# Patient Record
Sex: FEMALE | Race: WHITE | NOT HISPANIC OR LATINO | Employment: PART TIME | URBAN - METROPOLITAN AREA
[De-identification: names, ages, dates, MRNs, and addresses within clinical notes are randomized per-mention and may not be internally consistent; named-entity substitution may affect disease eponyms.]

---
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
## 2018-01-11 NOTE — PROGRESS NOTES
Assessment   1  Encounter for preventive health examination (V70 0) (Z00 00)  2  Need for meningococcal vaccination (V03 89) (Z23)  3  Never smoked cigarettes (V49 89) (Z78 9)  4  Screening for depression (V79 0) (Z13 89)    Plan  Depression with anxiety    · PHQ-9 Adult Depression Screening ; every 4 months; Last 08JEN8547; Next 07REW8054;  Status:Active  Depression with anxiety, Screening for depression    · *VB-Depression Screening; Status:Complete;   Done: 62ILU8979 06:45PM   · *VB-Depression Screening ; every 4 months; Last 83QWO0450; Next 16KIF2111;  [address] Maintenance    · Brush your teeth 3 times a day and floss at least once a day ; Status:Complete;   Done:  36IPK9706   · Drink plenty of fluids ; Status:Complete;   Done: 44JFS6388   · Eat foods that are high in calcium ; Status:Complete;   Done: 22SQO2648   · We encourage all of our patients to exercise regularly  30 minutes of exercise or physical  activity five or more days a week is recommended for children and adults ;  Status:Complete;   Done: 58FWX4245   · We recommend routine visits to a dentist ; Status:Complete;   Done: 94AOB9436   · We recommend that you follow the "Mediterranean diet "; Status:Complete;   Done:  07KVE4090   · Call [phone] if: You find a new or different kind of lump in your breast ;  Status:Complete;   Done: 45RUQ7181   · Call [phone] if: You have any warning signs of skin cancer ; Status:Complete;    Done: 72LOY7494  Need for meningococcal vaccination    · Administered: Trumenba Intramuscular Suspension Prefilled Syringe  Screening for depression    · Call [phone] if: You have feelings of extreme sadness and feelings of  hopelessness ; Status:Complete;   Done: 32MPE9474   · Begin or continue regular aerobic exercise   Gradually work up to at least {count1}  sessions of {dur1} of exercise a week ; Status:Complete;   Done: 18BSO3685    Discussion/Summary  health maintenance visit cervical cancer screening is not indicated Breast cancer screening: breast cancer screening is not indicated  Colorectal cancer screening: colorectal cancer screening is not indicated  The immunizations are needed and immunizations will be given as outlined in the orders  Patient discussion: discussed with the patient  1) CONTINUE CURRENT MEDICATION  2) had disease: varicella 5/98  3) trumemba given today, return in 6 months 1  for trumemba #2   Possible side effects of new medications were reviewed with the patient/guardian today  The treatment plan was reviewed with the patient/guardian  The patient/guardian understands and agrees with the treatment plan       1 Amended By: Bhavna Miguel; Jun 28 2017 3:06 PM EST    Chief Complaint  PT HERE FOR A COLLEGE PE  PT WILL GET THE [facility]  History of Present Illness  HM, Adult Female: The patient is being seen for a health maintenance evaluation  The last health maintenance visit was 1 year(s) ago  General Health: The patient's health since the last visit is described as good  She has regular dental visits  She denies vision problems  She denies hearing loss  Immunizations status: not up to date  Lifestyle:  She consumes a diverse and healthy diet  She does not have any weight concerns  She does not exercise regularly  She does not use tobacco  She denies alcohol use  She denies drug use  Reproductive health: the patient is premenopausal    Screening: cancer screening reviewed and updated  metabolic screening reviewed and updated  risk screening reviewed and updated  HPI: pt here for well exam  denies complaints today  denies recent illnesses  menses regular on nuvaring  going to college in [employer]  states has not had a healthy diet  would like to lose weight  not much regular activity other than standing at [employer] for work all day and tired when she gets home     mom states she had chickenpox and states she had one vaccination but do not see documentation  some intermittent abdominal cramping with certain foods  Review of Systems    Constitutional: No fever, no chills, feels well, no tiredness, no recent weight gain or weight loss  Eyes: No complaints of eye pain, no red eyes, no eyesight problems, no discharge, no dry eyes, no itching of eyes  ENT: no complaints of earache, no loss of hearing, no nose bleeds, no nasal discharge, no sore throat, no hoarseness  Cardiovascular: No complaints of slow heart rate, no fast heart rate, no chest pain, no palpitations, no leg claudication, no lower extremity edema  Respiratory: No complaints of shortness of breath, no wheezing, no cough, no SOB on exertion, no orthopnea, no PND  Gastrointestinal: No complaints of abdominal pain, no constipation, no nausea or vomiting, no diarrhea, no bloody stools  Genitourinary: No complaints of dysuria, no incontinence, no pelvic pain, no dysmenorrhea, no vaginal discharge or bleeding  Musculoskeletal: No complaints of arthralgias, no myalgias, no joint swelling or stiffness, no limb pain or swelling  Integumentary: No complaints of skin rash or lesions, no itching, no skin wounds, no breast pain or lump  Neurological: No complaints of headache, no confusion, no convulsions, no numbness, no dizziness or fainting, no tingling, no limb weakness, no difficulty walking  Psychiatric: Not suicidal, no sleep disturbance, no anxiety or depression, no change in personality, no emotional problems  Endocrine: No complaints of proptosis, no hot flashes, no muscle weakness, no deepening of the voice, no feelings of weakness  Hematologic/Lymphatic: No complaints of swollen glands, no swollen glands in the neck, does not bleed easily, does not bruise easily  Active Problems   1  Allergic rhinitis (477 9) (J30 9)  2  Contraceptive use (V25 40) (Z30 40)  3  Depression with anxiety (300 4) (F41 8)  4   Need for influenza vaccination (V04 81) (Z23)    Past Medical History    · History of acute pharyngitis (V12 69) (Z87 09)   · History of low back pain (V13 59) (Z87 39)   · History of varicella (V12 09) (Z86 19)    Surgical History    · Denied: History Of Prior Surgery    Family History  Mother    · Family history of depression (V17 0) (Z81 8)   · Family history of hyperlipidemia (V18 19) (Z83 49)   · Family history of hypertension (V17 49) (Z82 49)  Father    · Family history of depression (V17 0) (Z81 8)   · Family history of diabetes mellitus (V18 0) (Z83 3)   · Family history of hypertension (V17 49) (Z82 49)  Family History    · Family history of Reported Family History Of Mental Illness (Not Retardation)    Social History    · Currently In School Public   · Living With Parents   · Never Drank Alcohol   · Never smoked cigarettes (V49 89) (Z78 9)    Current Meds  1  LaMICtal 100 MG Oral Tablet; take 3 tablet every day; Therapy: (Recorded:17Nov2015) to Recorded  2  Levsin/SL 0 125 MG Sublingual Tablet Sublingual; DISSOLVE 1 TABLET UNDER   TONGUE 4 TIMES A DAY AS NEEDED FOR ABDOMINAL PAIN;   Therapy: 56CJA9410 to (Last Rx:10Mar2016)  Requested for: 21XDW6660 Ordered  3  NuvaRing 0 12-0 015 MG/24HR Vaginal Ring; INSERT 1 RING VAGINALLY FOR 3   WEEKS THEN 1 WEEK OFF; Therapy: 62LKC9279 to (Last Rx:91Rhs3370)  Requested for: 94Ezo0764 Ordered    Allergies   1  No Known Drug Allergies    Vitals   Recorded: 12Jun2017 05:56PM   Temperature 97 2 F   Heart Rate 78   Systolic 985   Diastolic 66   Height 5 ft 9 in   Weight 161 lb    BMI Calculated 23 78   BSA Calculated 1 88   BMI Percentile 71 %   2-20 Stature Percentile 97 %   2-20 Weight Percentile 88 %   O2 Saturation 99     Physical Exam    Constitutional   General appearance: No acute distress, well appearing and well nourished  Head and Face   Head and face: Normal     Palpation of the face and sinuses: No sinus tenderness  Eyes   Conjunctiva and lids: No swelling, erythema or discharge      Pupils and irises: Equal, round, reactive to light  Ears, Nose, Mouth, and Throat   External inspection of ears and nose: Normal     Otoscopic examination: Tympanic membranes translucent with normal light reflex  Canals patent without erythema  Hearing: Normal     Nasal mucosa, septum, and turbinates: Normal without edema or erythema  Lips, teeth, and gums: Normal, good dentition  Oropharynx: Normal with no erythema, edema, exudate or lesions  Neck   Neck: Supple, symmetric, trachea midline, no masses  Thyroid: Normal, no thyromegaly  Pulmonary   Respiratory effort: No increased work of breathing or signs of respiratory distress  Auscultation of lungs: Clear to auscultation  Cardiovascular   Auscultation of heart: Normal rate and rhythm, normal S1 and S2, no murmurs  Examination of extremities for edema and/or varicosities: Normal     Abdomen   Abdomen: Non-tender, no masses  Liver and spleen: No hepatomegaly or splenomegaly  Lymphatic   Palpation of lymph nodes in neck: No lymphadenopathy  Musculoskeletal   Gait and station: Normal     Digits and nails: Normal without clubbing or cyanosis  Joints, bones, and muscles: Normal     Range of motion: Normal     Stability: Normal     Muscle strength/tone: Normal     Skin   Skin and subcutaneous tissue: Normal without rashes or lesions  Neurologic   Cortical function: Normal mental status  Coordination: Normal finger to nose and heel to shin  Psychiatric   Judgment and insight: Normal     Orientation to person, place, and time: Normal     Recent and remote memory: Intact      Mood and affect: Normal        Results/Data  *VB-Depression Screening 12Jun2017 06:45PM Juvenal Taylor     Test Name Result Flag Reference   Depression Scale Result      Depression Screen - Positive Findings     PHQ-9 Adult Depression Screening 12Jun2017 06:44PM Devin Benjamin     Test Name Result Flag Reference   PHQ-9 Adult Depression Score 11     Over the last two weeks, how often have you been bothered by any of the following problems? Little interest or pleasure in doing things: Several days - 1  Feeling down, depressed, or hopeless: Several days - 1  Trouble falling or staying asleep, or sleeping too much: More than half the days - 2  Feeling tired or having little energy: Nearly every day - 3  Poor appetite or over eating: More than half the days - 2  Feeling bad about yourself - or that you are a failure or have let yourself or your family down: Several days - 1  Trouble concentrating on things, such as reading the newspaper or watching television: Several days - 1  Moving or speaking so slowly that other people could have noticed  Or the opposite -  being so fidgety or restless that you have been moving around a lot more than usual: Not at all - 0  Thoughts that you would be better off dead, or of hurting yourself in some way: Not at all - 0   PHQ-9 Adult Depression Screening Positive     PHQ-9 Difficulty Level Somewhat difficult     PHQ-9 Severity Moderate Depression         Health Management  Depression with anxiety   PHevery-9 Adult Depression Screening; every 4 months; Last 77TPA0681; Next Due:  30MUY6019;  Active    Signatures   Electronically signed by : Dominick Cheema DO; Jun 28 2017  3:06PM EST                       (Author)

## 2018-01-11 NOTE — PROGRESS NOTES
Assessment    1  Encounter for preventive health examination (V70 0) (Z00 00)   2  Lower back pain (724 2) (M54 5)   3  Need for hepatitis A vaccination (V05 3) (Z23)    Plan  Need for hepatitis A vaccination    · Hepatitis A    Discussion/Summary    Impression:   No growth, development, elimination, feeding, skin and sleep concerns  No medication changes  Information discussed with patient  Recommend immz  update, patient agrees to get hepatitis A  RTO as needed  Form filled out for school  Chief Complaint  PT IS HERE FOR YEARLY WELLNESS / SCHOOL PHYSICAL  VISION CHECKED AND DOCUMENTED IN CHART  PT GIVEN INFORMATION ON HEP A AND TRUMENBA  MOM IN WAITING ROOM IS GOOD WITH ANY IMMUNIZATIONS DUE  PT DOES PLAN TO ATTEND COLLEGE  History of Present Illness  HM, 12-18 years Female (Brief): Sky Cordon presents today for routine health maintenance with her mother and MOTHER IN WAITING ROOM  General Health: The child's health since the last visit is described as good  Dental hygiene: Good  Caregiver concerns:   Caregivers deny concerns regarding nutrition, sleep, school and elimination  Nutrition/Elimination:   Diet:  her current diet is diverse and healthy  Sleep:  No sleep issues are reported  Behavior: The child's temperament is described as calm, happy and energetic  Health Risks:   Weekly activity: 2 hour(s) of exercise per day and she gets exercise 4 times per week  Childcare/School: She is in grade 12 in [employer] high school  School performance has been good  Sports Participation Questions:   HPI: 23year old white female presents for PE  Had one episode of abdominal cramps since 3/8/16- ER visit for abd  cramps  LNMP- 3/27/16, regular, on now  Denies having vaginal discharge  Patient on crew team/rowing  Sees psychiatrist for depression, every 3 months, currently on Lamictal  Plans to go to [facility]        Review of Systems    Constitutional: no chills, no fever, not feeling poorly and not feeling tired  Eyes: no eyesight problems and no purulent discharge from the eyes  ENT: no nasal discharge, no earache, no hearing loss, no nosebleeds and no sore throat  Cardiovascular: no chest pain and no palpitations  Respiratory: no cough and no shortness of breath  Gastrointestinal: no abdominal pain, no nausea, no vomiting, no constipation and no diarrhea  Neurological: no headache, no numbness, no tingling, no dizziness and no fainting  Psychiatric: not suicidal, no emotional problems, no sleep disturbances, no depression and no anxiety  Over the past 2 weeks, how often have you been bothered by the following problems? 1 ) Little interest or pleasure in doing things? Not at all    2 ) Feeling down, depressed or hopeless? Not at all    3 ) Trouble falling asleep or sleeping too much? Several days  4 ) Feeling tired or having little energy? Several days  5 ) Poor appetite or overeating? Not at all    6 ) Feeling bad about yourself, or that you are a failure, or have let yourself or your family down? Not at all    7 ) Trouble concentrating on things, such as reading a newspaper or watching television? Several days  8 ) Moving or speaking so slowly that other people could have noticed, or the opposite, moving or speaking faster than usual? Not at all    9 ) Thoughts that you would be better off dead or of hurting yourself in some way? Not at all  Score 3      Active Problems    1  Allergic rhinitis (477 9) (J30 9)   2  Contraceptive use (V25 40) (Z30 40)   3  Depression with anxiety (300 4) (F41 8)   4  Folliculitis (938 2) (X90 4)   5  Gastroenteritis (558 9) (K52 9)   6  Lower back pain (724 2) (M54 5)   7  Need for HPV vaccination (V04 89) (Z23)   8  Need for influenza vaccination (V04 81) (Z23)   9  Screening for deficiency anemia (V78 1) (Z13 0)   10   Screening for endocrine, nutritional, metabolic and immunity disorder (V77 99) (Z13 29,Z13 0,Z13 21,Z13 228)    Past Medical History    · History of acute pharyngitis (V12 69) (Z87 09)   · History of varicella (V12 09) (Z86 19)    Surgical History    · Denied: History Of Prior Surgery    Family History    · Family history of depression (V17 0) (Z81 8)   · Family history of hyperlipidemia (V18 19) (Z83 49)   · Family history of hypertension (V17 49) (Z82 49)    · Family history of depression (V17 0) (Z81 8)   · Family history of diabetes mellitus (V18 0) (Z83 3)   · Family history of hypertension (V17 49) (Z82 49)    · Family history of Reported Family History Of Mental Illness (Not Retardation)    Social History    · Currently In School Public   · Living With Parents   · Never A Smoker   · Never Drank Alcohol    Current Meds   1  Donnatal 16 2 MG/5ML Oral Elixir; 1 tsp tid prn; Therapy: 92NUY6592 to (Last Rx:10Mar2016)  Requested for: 95AWR7404 Ordered   2  LaMICtal 100 MG Oral Tablet; take 3 tablet every day; Therapy: (Recorded:17Nov2015) to Recorded   3  Levsin/SL 0 125 MG Sublingual Tablet Sublingual; DISSOLVE 1 TABLET UNDER   TONGUE 4 TIMES A DAY AS NEEDED FOR ABDOMINAL PAIN;   Therapy: 93HQP5673 to (Last Rx:10Mar2016)  Requested for: 08UVW1037 Ordered   4  NuvaRing 0 12-0 015 MG/24HR Vaginal Ring; Therapy: 67MPD6176 to Recorded    Allergies    1  No Known Drug Allergies    Vitals   Recorded: 40RHB6214 02:54PM   Temperature 97 4 F   Heart Rate 68   Systolic 981   Diastolic 70   Height 5 ft 9 in   Weight 160 lb 8 0 oz   BMI Calculated 23 7   BSA Calculated 1 88   O2 Saturation 98     Physical Exam    Constitutional - General appearance: No acute distress, well appearing and well nourished  Head and Face - Head and face: Normocephalic, atraumatic  Eyes - Conjunctiva and lids: No injection, edema or discharge  Pupils and irises: Equal, round, reactive to light bilaterally     Ears, Nose, Mouth, and Throat - External inspection of ears and nose: Normal without deformities or discharge  Otoscopic examination: Tympanic membranes gray, translucent with good bony landmarks and light reflex  Canals patent without erythema  Hearing: Normal  Nasal mucosa, septum, and turbinates: Normal, no edema or discharge  Lips, teeth, and gums: Normal, good dentition  Oropharynx: Moist mucosa, normal tongue and tonsils without lesions  Neck - Neck: Supple, symmetric, no masses  Thyroid: No thyromegaly  Pulmonary - Respiratory effort: Normal respiratory rate and rhythm, no increased work of breathing  Auscultation of lungs: Clear bilaterally  Cardiovascular - Auscultation of heart: Regular rate and rhythm, normal S1 and S2, no murmur  Examination of extremities for edema and/or varicosities: Normal    Chest - Breasts: Normal    Abdomen - Abdomen: Normal bowel sounds, soft, non-tender, no masses  Liver and spleen: No hepatomegaly or splenomegaly  Lymphatic - Palpation of lymph nodes in neck: No anterior or posterior cervical lymphadenopathy  Musculoskeletal - Gait and station: Normal gait  Digits and nails: Normal without clubbing or cyanosis  Inspection/palpation of joints, bones, and muscles: Normal  Evaluation for scoliosis: No scoliosis on exam  Range of motion: Normal  Stability: No joint instability  Muscle strength/tone: Normal    Skin - Skin and subcutaneous tissue: Normal    Neurologic - Sensation: Normal    Psychiatric - judgment and insight: Normal  Orientation to person, place, and time: Normal  Recent and remote memory: Normal  Mood and affect: Normal       Procedure    Procedure: Visual Acuity Test    Indication: routine screening  Inforrmation supplied by a Snellen chart  Results: 20/20 in both eyes without corrective device, 20/25 in the right eye without corrective device, 20/25 in the left eye without corrective device normal in both eyes     Color vision was and the results were normal       Signatures   Electronically signed by : JUDE Laura; Mar 29 2016  3:39PM EST (Author)    Electronically signed by : Faisal Vigil DO; Mar 29 2016  6:54PM EST                       (Author)

## 2018-01-12 VITALS
HEART RATE: 78 BPM | BODY MASS INDEX: 23.85 KG/M2 | SYSTOLIC BLOOD PRESSURE: 108 MMHG | WEIGHT: 161 LBS | DIASTOLIC BLOOD PRESSURE: 66 MMHG | HEIGHT: 69 IN | OXYGEN SATURATION: 99 % | TEMPERATURE: 97.2 F

## 2018-01-14 NOTE — RESULT NOTES
Verified Results  [facility]) UA/M w/rflx Culture, Comp [id number] 12:00AM [place] [facility]     Test Name Result Flag Reference   Specific Gravity 1 020  1 005-1 030   pH 6 5  5 0-7 5   Urine-Color Yellow  Yellow   Appearance Cloudy A Clear   WBC Esterase 3+ A Negative   Protein 2+ A Negative/Trace   Glucose Negative  Negative   Ketones Negative  Negative   Occult Blood 2+ A Negative   Bilirubin Negative  Negative   Urobilinogen,Semi-Qn 0 2 EU/dL  0 2-1 0   Nitrite, Urine Positive A Negative   Microscopic Examination See below:     Urinalysis Reflex Comment     This specimen has reflexed to a Urine Culture  WBC >30 /hpf A 0 -  5   RBC 11-30 /hpf A 0 -  2   Epithelial Cells (non renal) 0-10 /hpf  0 - 10   Mucus Threads Present  Not Estab     Bacteria Few  None seen/Few   Result 1 Escherichia coli A    50,000-100,000 colony forming units per mL   Urine Culture,Comprehensive Final report A    Antimicrobial Susceptibility Comment     ** S = Susceptible; I = Intermediate; R = Resistant **                     P = Positive; N = Negative              MICS are expressed in micrograms per mL     Antibiotic                 RSLT#1    RSLT#2    RSLT#3    RSLT#4  Amoxicillin/Clavulanic Acid    S  Ampicillin                     R  Cefazolin                      R  Cefepime                       S  Ceftriaxone                    R  Cefuroxime                     R  Cephalothin                    R  Ciprofloxacin                  S  Ertapenem                      S  Gentamicin                     S  Imipenem                       S  Levofloxacin                   S  Nitrofurantoin                 S  Piperacillin                   R  Tetracycline                   R  Tobramycin                     S  Trimethoprim/Sulfa             R

## 2018-03-07 NOTE — PROGRESS NOTES
History of Present Illness    Revaccination   Vaccine Information: Vaccine(s) Given (names): hep a #3  Spoke with patient regarding vaccine out of temperature range  Action(s): Pt will be revaccinated  Pt called (attempt 1): [id number] LDS  Other Information: 03/17/2017 - SPOKE WITH PT, WILL BE REVACCINATED FOR HEP A ON 03/23  LDS    03/23/2017 revac Hep B #3  Revaccination Completed: 03/23/2017  Active Problems    1  Acute urinary tract infection (599 0) (N39 0)   2  Allergic rhinitis (477 9) (J30 9)   3  Contraceptive use (V25 40) (Z30 40)   4  Depression with anxiety (300 4) (F41 8)   5  Need for hepatitis A vaccination (V05 3) (Z23)   6  Need for influenza vaccination (V04 81) (Z23)   7  Need for revaccination (V05 9) (Z23)   8  Symptoms involving urinary system (788 99) (R39 9)    Immunizations  DTP/DTaP --- [facility]: 1998; [facility] Cord: 97-XEY-2176; Libra Dave: 1998; Series4:  05-Oct-1999   Hepatitis A --- [facility]: 29-Mar-2016   Hepatitis B --- [facility]: 1998; [facility] Cord: 05-VKQ-8633; Libra Watsonuss: 1998   HIB --- [facility]: 31-SAD-4194; [facility] Cord: 35-XHU-7527; Libra Watsonuss: 1998; Series4: 19-Jul-1999   HPV --- [facility]: 58-Juv-6845Buwbrfvs Night: 16-Apr-2014; Libra Dave: 17-Nov-2015   Influenza --- [facility]: Permanently Deferred: Pt refuses, [id number]; Series2: 15-Erickson-2013; Libra Watsonuss:  17-Nov-2015   Meningococcal --- Series1: 30-Jul-2010; Series2: 13-Mar-2014   MMR --- [facility]: 82-Ycx-5974[place] Night: 20-Mar-2003   PCV --- Series1: 21-Mar-2001   Polio --- Series1: 1998; Series2: 05-Oct-1999   Tdap --- [facility]: 30-Jul-2010     Current Meds   1  LaMICtal 100 MG Oral Tablet; take 3 tablet every day   2  Levsin/SL 0 125 MG Sublingual Tablet Sublingual; DISSOLVE 1 TABLET UNDER   TONGUE 4 TIMES A DAY AS NEEDED FOR ABDOMINAL PAIN   3  NuvaRing 0 12-0 015 MG/24HR Vaginal Ring; INSERT 1 RING VAGINALLY FOR 3   WEEKS THEN 1 WEEK OFF    Allergies    1   No Known Drug Allergies    Future Appointments    Date/Time Provider Specialty Site   03/23/2017 12:00 PM [place], Nurse Schedule  [facility]     Signatures   Electronically signed by : Radha Holley DO; Mar 23 2017 12:33PM EST                       (Author)

## 2018-05-31 ENCOUNTER — OFFICE VISIT (OUTPATIENT)
Dept: FAMILY MEDICINE CLINIC | Facility: CLINIC | Age: 18
End: 2018-05-31
Payer: COMMERCIAL

## 2018-05-31 VITALS
WEIGHT: 159.25 LBS | BODY MASS INDEX: 23.59 KG/M2 | HEART RATE: 79 BPM | OXYGEN SATURATION: 97 % | DIASTOLIC BLOOD PRESSURE: 72 MMHG | TEMPERATURE: 98.6 F | SYSTOLIC BLOOD PRESSURE: 112 MMHG | HEIGHT: 69 IN

## 2018-05-31 DIAGNOSIS — J03.90 TONSILLITIS: Primary | ICD-10-CM

## 2018-05-31 PROCEDURE — 99213 OFFICE O/P EST LOW 20 MIN: CPT | Performed by: FAMILY MEDICINE

## 2018-05-31 RX ORDER — AMOXICILLIN AND CLAVULANATE POTASSIUM 875; 125 MG/1; MG/1
1 TABLET, FILM COATED ORAL EVERY 12 HOURS SCHEDULED
Qty: 20 TABLET | Refills: 0 | Status: SHIPPED | OUTPATIENT
Start: 2018-05-31 | End: 2018-06-10

## 2018-05-31 RX ORDER — HYOSCYAMINE SULFATE 0.12 MG/1
TABLET SUBLINGUAL
COMMUNITY
Start: 2016-03-10

## 2018-05-31 RX ORDER — ETONOGESTREL AND ETHINYL ESTRADIOL 11.7; 2.7 MG/1; MG/1
INSERT, EXTENDED RELEASE VAGINAL
COMMUNITY
Start: 2016-03-10

## 2018-05-31 RX ORDER — LAMOTRIGINE 100 MG/1
TABLET ORAL
COMMUNITY

## 2018-05-31 NOTE — PROGRESS NOTES
Assessment/Plan:    No problem-specific Assessment & Plan notes found for this encounter  Diagnoses and all orders for this visit:    Tonsillitis  -     amoxicillin-clavulanate (AUGMENTIN) 875-125 mg per tablet; Take 1 tablet by mouth every 12 (twelve) hours for 10 days        I advised the patient she may very well have the beginning of an abscess though I am surprised she does not have fevers and symptoms of strep throat  I am going to go ahead and start Augmentin for her but advised that if abscess continues to grow she will need to go to the emergency room and have a drained  She does have an appointment with a new ENT next week Thursday, a week from today, and if her abscess grows I do not think that she should wait this long to have it drained  She can certainly call them and let them know she has an abscess to see if they can fit her in by suspect they will tailor to go to the emergency room  She should keep that appointment as she likely does need her tonsils removed  Subjective:      Patient ID: Rich Vega is a 19 y o  female      The pt is here today because she feels like she might have an abscess in her left tonsil  She noticed it yesterday  She does have bed breath  She does not necessarily have any pain  She does not have any fevers or feel sick  But her left tonsil is bulging out and she said it looks like a pimple  She has never had an abscess before   Has chronic tonsillitis  She has seen ENT in the past but this was in [place]  She has an appt with a new ENT next Thursday           The following portions of the patient's history were reviewed and updated as appropriate: allergies, current medications, past family history, past medical history, past social history, past surgical history and problem list     Review of Systems      Objective:  Vitals:    05/31/18 0929   BP: 112/72   Pulse: 79   Temp: 98 6 °F (37 °C)   SpO2: 97%   Weight: 72 2 kg (159 lb 4 oz)   Height: 5' 9" (1 753 m)      Physical Exam   Constitutional: She is oriented to person, place, and time  She appears well-developed and well-nourished  No distress  HENT:   The patient's tonsils are both large but the left is slightly erythematous and inflamed, there is no drainage; she does have bad breath; her voice sounds garbled   Neurological: She is alert and oriented to person, place, and time  Skin: Skin is warm and dry  She is not diaphoretic  Psychiatric: She has a normal mood and affect

## 2018-05-31 NOTE — PATIENT INSTRUCTIONS
Peritonsillar Abscess   AMBULATORY CARE:   A peritonsillar abscess  or PTA, is a collection of pus in the peritonsillar space  The peritonsillar space is the area between your tonsil and the back wall of your throat  It is near the opening of the tubes leading to your stomach and lungs  Common symptoms and symptoms include the following:   · Sore throat, often severe    · Drooling or bad breath    · Voice change    · Fever    · Loss of appetite    · Red and swollen tonsil or throat    · Ear pain    · Pain or difficulty when you open or close your mouth, swallow, and move your neck  Call 911 for any of the following:   · You have trouble breathing  Seek care immediately if:   · You have more pain, swelling, or redness in your throat  · Your symptoms get worse or do not get better, even with treatment  · You have difficulty or pain when you swallow, or you cannot eat or drink  Contact your healthcare provider if:   · Your abscess returns  · You have questions or concerns about your condition or care  Treatment for a peritonsillar abscess  may depend on how severe it is  Surgery or an incision and drainage may be needed if other treatments do not work  Medicines:   · Antibiotics  help treat or prevent a bacterial infection  · Acetaminophen  decreases pain and fever  It is available without a doctor's order  Ask how much to take and how often to take it  Follow directions  Acetaminophen can cause liver damage if not taken correctly  · Steroids  decrease swelling  · NSAIDs , such as ibuprofen, help decrease swelling, pain, and fever  This medicine is available with or without a doctor's order  NSAIDs can cause stomach bleeding or kidney problems in certain people  If you take blood thinner medicine, always ask if NSAIDs are safe for you  Always read the medicine label and follow directions   Do not give these medicines to children under 10months of age without direction from your child's healthcare provider  · Take your medicine as directed  Contact your healthcare provider if you think your medicine is not helping or if you have side effects  Tell him or her if you are allergic to any medicine  Keep a list of the medicines, vitamins, and herbs you take  Include the amounts, and when and why you take them  Bring the list or the pill bottles to follow-up visits  Carry your medicine list with you in case of an emergency  Decrease your risk for a peritonsillar abscess:   · Care for your mouth and teeth  Brush and floss your teeth after you eat, and before you go to sleep  Gently brush your teeth and gums using a brush with soft bristles  Use a mouth rinse after you brush  See your dentist for regular check-ups  · Do not delay treatment for a sore throat  Make an appointment to see your doctor if you have a sore throat that continues for more than a few days  If you have a fever with a sore throat, call your doctor that day  Early treatment may prevent a peritonsillar abscess  Take your antibiotic for throat infections until it is done  · Do not smoke  Nicotine and other chemicals in cigarettes and cigars may increase your risk for a peritonsillar abscess  Ask your healthcare provider for information if you currently smoke and need help to quit  E-cigarettes or smokeless tobacco still contain nicotine  Talk to your healthcare provider before you use these products  Manage your symptoms:   · A liquid diet  may decrease your discomfort until the PTA is healed  A liquid diet may include jello, juices, or ice pops  Follow up with your healthcare provider as directed:  Write down your questions so you remember to ask them during your visits  © 2017 [employer]  Information is for End User's use only and may not be sold, redistributed or otherwise used for commercial purposes   All illustrations and images included in CareNotes® are the copyrighted property of [employer]  or Felib[employer] Durán  The above information is an  only  It is not intended as medical advice for individual conditions or treatments  Talk to your doctor, nurse or pharmacist before following any medical regimen to see if it is safe and effective for you

## 2018-06-29 ENCOUNTER — TELEPHONE (OUTPATIENT)
Dept: FAMILY MEDICINE CLINIC | Facility: CLINIC | Age: 18
End: 2018-06-29

## 2018-06-29 DIAGNOSIS — J30.1 SEASONAL ALLERGIC RHINITIS DUE TO POLLEN: Primary | ICD-10-CM

## 2018-06-29 RX ORDER — MONTELUKAST SODIUM 10 MG/1
10 TABLET ORAL
Qty: 30 TABLET | Refills: 2 | Status: SHIPPED | OUTPATIENT
Start: 2018-06-29 | End: 2018-09-22 | Stop reason: SDUPTHER

## 2018-09-22 DIAGNOSIS — J30.1 SEASONAL ALLERGIC RHINITIS DUE TO POLLEN: ICD-10-CM

## 2018-09-22 RX ORDER — MONTELUKAST SODIUM 10 MG/1
10 TABLET ORAL
Qty: 90 TABLET | Refills: 1 | Status: SHIPPED | OUTPATIENT
Start: 2018-09-22 | End: 2019-03-16 | Stop reason: SDUPTHER

## 2019-03-16 DIAGNOSIS — J30.1 SEASONAL ALLERGIC RHINITIS DUE TO POLLEN: ICD-10-CM

## 2019-03-17 RX ORDER — MONTELUKAST SODIUM 10 MG/1
10 TABLET ORAL
Qty: 90 TABLET | Refills: 0 | Status: SHIPPED | OUTPATIENT
Start: 2019-03-17 | End: 2019-06-20 | Stop reason: SDUPTHER

## 2019-06-20 DIAGNOSIS — J30.1 SEASONAL ALLERGIC RHINITIS DUE TO POLLEN: ICD-10-CM

## 2019-06-20 RX ORDER — MONTELUKAST SODIUM 10 MG/1
10 TABLET ORAL
Qty: 90 TABLET | Refills: 0 | Status: SHIPPED | OUTPATIENT
Start: 2019-06-20 | End: 2019-06-21 | Stop reason: SDUPTHER

## 2019-06-21 DIAGNOSIS — J30.1 SEASONAL ALLERGIC RHINITIS DUE TO POLLEN: ICD-10-CM

## 2019-06-21 RX ORDER — MONTELUKAST SODIUM 10 MG/1
10 TABLET ORAL
Qty: 30 TABLET | Refills: 2 | Status: SHIPPED | OUTPATIENT
Start: 2019-06-21

## 2019-11-03 ENCOUNTER — TELEPHONE (OUTPATIENT)
Dept: FAMILY MEDICINE CLINIC | Facility: CLINIC | Age: 19
End: 2019-11-03

## 2020-01-09 ENCOUNTER — TELEPHONE (OUTPATIENT)
Dept: FAMILY MEDICINE CLINIC | Facility: CLINIC | Age: 20
End: 2020-01-09

## 2020-01-23 ENCOUNTER — TELEPHONE (OUTPATIENT)
Dept: FAMILY MEDICINE CLINIC | Facility: CLINIC | Age: 20
End: 2020-01-23

## 2021-06-21 ENCOUNTER — APPOINTMENT (RX ONLY)
Dept: URBAN - METROPOLITAN AREA CLINIC 166 | Facility: CLINIC | Age: 21
Setting detail: DERMATOLOGY
End: 2021-06-21

## 2021-06-21 DIAGNOSIS — L71.0 PERIORAL DERMATITIS: ICD-10-CM | Status: WORSENING

## 2021-06-21 PROCEDURE — ? PRESCRIPTION

## 2021-06-21 PROCEDURE — ? COUNSELING

## 2021-06-21 PROCEDURE — 99204 OFFICE O/P NEW MOD 45 MIN: CPT

## 2021-06-21 RX ORDER — METRONIDAZOLE 7.5 MG/G
CREAM TOPICAL
Qty: 1 | Refills: 1 | Status: ERX | COMMUNITY
Start: 2021-06-21

## 2021-06-21 RX ORDER — DOXYCYCLINE HYCLATE 100 MG/1
TABLET, COATED ORAL
Qty: 60 | Refills: 1 | Status: ERX | COMMUNITY
Start: 2021-06-21

## 2021-06-21 RX ORDER — DESONIDE 0.5 MG/ML
LOTION TOPICAL
Qty: 1 | Refills: 1 | Status: ERX | COMMUNITY
Start: 2021-06-21

## 2021-06-21 RX ADMIN — METRONIDAZOLE: 7.5 CREAM TOPICAL at 00:00

## 2021-06-21 RX ADMIN — DOXYCYCLINE HYCLATE: 100 TABLET, COATED ORAL at 00:00

## 2021-06-21 RX ADMIN — DESONIDE: 0.5 LOTION TOPICAL at 00:00

## 2021-06-21 ASSESSMENT — LOCATION DETAILED DESCRIPTION DERM
LOCATION DETAILED: LEFT ORAL COMMISSURE
LOCATION DETAILED: RIGHT ORAL COMMISSURE
LOCATION DETAILED: LEFT LOWER CUTANEOUS LIP
LOCATION DETAILED: RIGHT ORAL COMMISSURE
LOCATION DETAILED: LEFT ORAL COMMISSURE

## 2021-06-21 ASSESSMENT — LOCATION SIMPLE DESCRIPTION DERM
LOCATION SIMPLE: LEFT LIP
LOCATION SIMPLE: RIGHT LIP
LOCATION SIMPLE: LEFT LIP
LOCATION SIMPLE: RIGHT LIP

## 2021-06-21 ASSESSMENT — INVESTIGATOR STATIC GLOBAL ASSESSMENT
IN YOUR EXPERIENCE, AMONG ALL PATIENTS YOU HAVE SEEN WITH THIS CONDITION, HOW SEVERE IS THIS PATIENT'S CONDITION?: MILD TO MODERATE

## 2021-06-21 ASSESSMENT — LOCATION ZONE DERM
LOCATION ZONE: LIP
LOCATION ZONE: LIP